# Patient Record
Sex: MALE | Race: WHITE | NOT HISPANIC OR LATINO | URBAN - METROPOLITAN AREA
[De-identification: names, ages, dates, MRNs, and addresses within clinical notes are randomized per-mention and may not be internally consistent; named-entity substitution may affect disease eponyms.]

---

## 2018-06-01 ENCOUNTER — IMPORTED ENCOUNTER (OUTPATIENT)
Dept: URBAN - METROPOLITAN AREA CLINIC 9 | Facility: CLINIC | Age: 73
End: 2018-06-01

## 2018-07-20 ENCOUNTER — IMPORTED ENCOUNTER (OUTPATIENT)
Dept: URBAN - METROPOLITAN AREA CLINIC 9 | Facility: CLINIC | Age: 73
End: 2018-07-20

## 2018-08-03 ENCOUNTER — IMPORTED ENCOUNTER (OUTPATIENT)
Dept: URBAN - METROPOLITAN AREA CLINIC 9 | Facility: CLINIC | Age: 73
End: 2018-08-03

## 2018-08-17 ENCOUNTER — IMPORTED ENCOUNTER (OUTPATIENT)
Dept: URBAN - METROPOLITAN AREA CLINIC 9 | Facility: CLINIC | Age: 73
End: 2018-08-17

## 2019-01-04 ENCOUNTER — IMPORTED ENCOUNTER (OUTPATIENT)
Dept: URBAN - METROPOLITAN AREA CLINIC 9 | Facility: CLINIC | Age: 74
End: 2019-01-04

## 2019-08-05 ENCOUNTER — IMPORTED ENCOUNTER (OUTPATIENT)
Dept: URBAN - METROPOLITAN AREA CLINIC 9 | Facility: CLINIC | Age: 74
End: 2019-08-05

## 2020-06-24 ENCOUNTER — IMPORTED ENCOUNTER (OUTPATIENT)
Dept: URBAN - METROPOLITAN AREA CLINIC 9 | Facility: CLINIC | Age: 75
End: 2020-06-24

## 2020-07-15 ENCOUNTER — IMPORTED ENCOUNTER (OUTPATIENT)
Dept: URBAN - METROPOLITAN AREA CLINIC 9 | Facility: CLINIC | Age: 75
End: 2020-07-15

## 2020-08-26 ENCOUNTER — IMPORTED ENCOUNTER (OUTPATIENT)
Dept: URBAN - METROPOLITAN AREA CLINIC 9 | Facility: CLINIC | Age: 75
End: 2020-08-26

## 2020-09-25 ENCOUNTER — IMPORTED ENCOUNTER (OUTPATIENT)
Dept: URBAN - METROPOLITAN AREA CLINIC 9 | Facility: CLINIC | Age: 75
End: 2020-09-25

## 2020-11-11 ENCOUNTER — IMPORTED ENCOUNTER (OUTPATIENT)
Dept: URBAN - METROPOLITAN AREA CLINIC 9 | Facility: CLINIC | Age: 75
End: 2020-11-11

## 2021-01-20 ENCOUNTER — IMPORTED ENCOUNTER (OUTPATIENT)
Dept: URBAN - METROPOLITAN AREA CLINIC 9 | Facility: CLINIC | Age: 76
End: 2021-01-20

## 2021-05-24 ENCOUNTER — MOHS SURGERY-ROUTINE (OUTPATIENT)
Dept: URBAN - METROPOLITAN AREA CLINIC 12 | Facility: CLINIC | Age: 76
Setting detail: DERMATOLOGY
End: 2021-05-24

## 2021-05-24 DIAGNOSIS — L57.0 ACTINIC KERATOSIS: ICD-10-CM

## 2021-05-24 PROCEDURE — 17313 MOHS 1 STAGE T/A/L: CPT

## 2021-06-10 NOTE — PATIENT DISCUSSION
Continue: Pred Forte (prednisolone acetate): drops,suspension: 1% 1 drop four times a day into both eyes 06-

## 2021-06-16 ENCOUNTER — IMPORTED ENCOUNTER (OUTPATIENT)
Dept: URBAN - METROPOLITAN AREA CLINIC 9 | Facility: CLINIC | Age: 76
End: 2021-06-16

## 2021-07-12 NOTE — PATIENT DISCUSSION
Stopped Today: Pred Forte (prednisolone acetate): drops,suspension: 1% 1 drop four times a day into both eyes 06-

## 2021-10-19 ASSESSMENT — TONOMETRY
OD_IOP_MMHG: 13
OD_IOP_MMHG: 13
OD_IOP_MMHG: 12
OS_IOP_MMHG: 13
OS_IOP_MMHG: 18
OD_IOP_MMHG: 13
OD_IOP_MMHG: 16
OD_IOP_MMHG: 16
OS_IOP_MMHG: 15
OS_IOP_MMHG: 14
OS_IOP_MMHG: 15
OS_IOP_MMHG: 19
OD_IOP_MMHG: 13
OS_IOP_MMHG: 20
OD_IOP_MMHG: 15
OD_IOP_MMHG: 16

## 2021-10-19 ASSESSMENT — VISUAL ACUITY
OD_PH: 20/70 SN
OD_PH: 20/40 SN
OS_CC: 20/25 -2 SN
OD_SC: 20/200 SN
OS_SC: 20/50 - SN
OD_CC: 20/20 - SN
OD_SC: 20/200 SN
OS_SC: 20/100 SN
OS_PH: 20/40 + SN
OS_PH: 20/30 SN
OD_CC: 20/30 +2 SN

## 2021-10-19 ASSESSMENT — KERATOMETRY
OD_K1POWER_DIOPTERS: 43.25
OS_K2POWER_DIOPTERS: 44.25
OS_AXISANGLE_DEGREES: 2
OS_AXISANGLE2_DEGREES: 92
OD_K2POWER_DIOPTERS: 43.5
OS_K1POWER_DIOPTERS: 42.75
OD_AXISANGLE_DEGREES: 148
OD_AXISANGLE2_DEGREES: 58

## 2021-11-30 ENCOUNTER — VISUAL FIELD ONLY (OUTPATIENT)
Dept: URBAN - METROPOLITAN AREA CLINIC 15 | Facility: CLINIC | Age: 76
End: 2021-11-30

## 2021-11-30 DIAGNOSIS — H40.013: ICD-10-CM

## 2021-11-30 PROCEDURE — 92083 EXTENDED VISUAL FIELD XM: CPT

## 2021-12-15 ENCOUNTER — ESTABLISHED PATIENT (OUTPATIENT)
Dept: URBAN - METROPOLITAN AREA CLINIC 15 | Facility: CLINIC | Age: 76
End: 2021-12-15

## 2021-12-15 DIAGNOSIS — H25.13: ICD-10-CM

## 2021-12-15 DIAGNOSIS — H40.013: ICD-10-CM

## 2021-12-15 PROCEDURE — 99213 OFFICE O/P EST LOW 20 MIN: CPT

## 2021-12-15 ASSESSMENT — VISUAL ACUITY
OD_CC: J3
OS_CC: 20/25
OU_CC: 20/20
OS_GLARE: 20/50-2
OS_CC: J2
OD_CC: 20/20
OU_CC: J1-3
OD_GLARE: 20/40-2

## 2021-12-15 ASSESSMENT — TONOMETRY
OD_IOP_MMHG: 14
OS_IOP_MMHG: 16

## 2022-07-05 RX ORDER — VALSARTAN 320 MG/1
TABLET ORAL
COMMUNITY

## 2022-07-05 RX ORDER — ATORVASTATIN CALCIUM 40 MG/1
TABLET, FILM COATED ORAL
COMMUNITY

## 2022-07-05 RX ORDER — FINASTERIDE 1 MG/1
TABLET, FILM COATED ORAL
COMMUNITY

## 2022-08-04 PROBLEM — M31.6 GIANT CELL ARTERITIS (HCC): Status: ACTIVE | Noted: 2022-08-04

## 2022-08-04 PROBLEM — I48.0 PAROXYSMAL ATRIAL FIBRILLATION (HCC): Status: ACTIVE | Noted: 2022-08-04

## 2022-08-04 PROBLEM — I10 ESSENTIAL HYPERTENSION: Status: ACTIVE | Noted: 2022-08-04

## 2022-08-04 PROBLEM — E78.2 MIXED HYPERLIPIDEMIA: Status: ACTIVE | Noted: 2022-08-04

## 2022-09-14 ENCOUNTER — ESTABLISHED PATIENT (OUTPATIENT)
Dept: URBAN - METROPOLITAN AREA CLINIC 15 | Facility: CLINIC | Age: 77
End: 2022-09-14

## 2022-09-14 DIAGNOSIS — M31.6: ICD-10-CM

## 2022-09-14 DIAGNOSIS — H25.13: ICD-10-CM

## 2022-09-14 DIAGNOSIS — H18.451: ICD-10-CM

## 2022-09-14 DIAGNOSIS — H40.013: ICD-10-CM

## 2022-09-14 PROCEDURE — 92015 DETERMINE REFRACTIVE STATE: CPT

## 2022-09-14 PROCEDURE — 99214 OFFICE O/P EST MOD 30 MIN: CPT

## 2022-09-14 PROCEDURE — 92133 CPTRZD OPH DX IMG PST SGM ON: CPT

## 2022-09-14 ASSESSMENT — KERATOMETRY
OS_AXISANGLE_DEGREES: 37
OS_K1POWER_DIOPTERS: 43.50
OD_AXISANGLE2_DEGREES: 127
OS_AXISANGLE2_DEGREES: 127
OD_AXISANGLE_DEGREES: 37
OD_K2POWER_DIOPTERS: 43.25
OD_K1POWER_DIOPTERS: 43.50
OS_K2POWER_DIOPTERS: 44.50

## 2022-09-14 ASSESSMENT — VISUAL ACUITY
OD_CC: J7
OD_GLARE: 20/25
OU_CC: 20/25+2
OS_CC: 20/25
OU_CC: J3
OS_GLARE: 20/25
OS_CC: J5
OD_CC: 20/25

## 2022-09-14 ASSESSMENT — TONOMETRY
OS_IOP_MMHG: 12
OD_IOP_MMHG: 15

## 2023-01-10 ASSESSMENT — KERATOMETRY
OS_K1POWER_DIOPTERS: 43.50
OD_K2POWER_DIOPTERS: 43.25
OS_AXISANGLE_DEGREES: 37
OD_K1POWER_DIOPTERS: 43.50
OD_AXISANGLE2_DEGREES: 127
OS_K2POWER_DIOPTERS: 44.50
OD_AXISANGLE_DEGREES: 37
OS_AXISANGLE2_DEGREES: 127

## 2023-01-11 ENCOUNTER — ESTABLISHED PATIENT (OUTPATIENT)
Dept: URBAN - METROPOLITAN AREA CLINIC 15 | Facility: CLINIC | Age: 78
End: 2023-01-11

## 2023-01-11 DIAGNOSIS — H40.013: ICD-10-CM

## 2023-01-11 PROCEDURE — 92083 EXTENDED VISUAL FIELD XM: CPT

## 2023-01-18 ENCOUNTER — ESTABLISHED PATIENT (OUTPATIENT)
Dept: URBAN - METROPOLITAN AREA CLINIC 15 | Facility: CLINIC | Age: 78
End: 2023-01-18

## 2023-01-18 DIAGNOSIS — H25.13: ICD-10-CM

## 2023-01-18 DIAGNOSIS — H18.451: ICD-10-CM

## 2023-01-18 DIAGNOSIS — H40.013: ICD-10-CM

## 2023-01-18 DIAGNOSIS — M31.6: ICD-10-CM

## 2023-01-18 PROCEDURE — 99213 OFFICE O/P EST LOW 20 MIN: CPT

## 2023-01-18 ASSESSMENT — VISUAL ACUITY
OD_CC: J1
OS_CC: J2+2
OS_CC: 20/30-2
OD_CC: 20/25-1
